# Patient Record
Sex: MALE | Race: OTHER | NOT HISPANIC OR LATINO | URBAN - METROPOLITAN AREA
[De-identification: names, ages, dates, MRNs, and addresses within clinical notes are randomized per-mention and may not be internally consistent; named-entity substitution may affect disease eponyms.]

---

## 2018-06-17 ENCOUNTER — EMERGENCY (EMERGENCY)
Facility: HOSPITAL | Age: 38
LOS: 0 days | Discharge: HOME | End: 2018-06-17
Admitting: PHYSICIAN ASSISTANT

## 2018-06-17 VITALS
HEART RATE: 89 BPM | OXYGEN SATURATION: 97 % | SYSTOLIC BLOOD PRESSURE: 137 MMHG | TEMPERATURE: 99 F | RESPIRATION RATE: 18 BRPM | DIASTOLIC BLOOD PRESSURE: 62 MMHG

## 2018-06-17 DIAGNOSIS — Z91.010 ALLERGY TO PEANUTS: ICD-10-CM
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
## 2018-06-17 DIAGNOSIS — M25.561 PAIN IN RIGHT KNEE: ICD-10-CM
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
## 2018-06-17 DIAGNOSIS — S89.91XA UNSPECIFIED INJURY OF RIGHT LOWER LEG, INITIAL ENCOUNTER: ICD-10-CM
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
## 2018-06-17 RX ORDER — IBUPROFEN 200 MG
600 TABLET ORAL ONCE
Qty: 0 | Refills: 0 | Status: COMPLETED | OUTPATIENT
Start: 2018-06-17 | End: 2018-06-17

## 2018-06-17 RX ADMIN — Medication 600 MILLIGRAM(S): at 21:25

## 2018-06-17 NOTE — ED PROVIDER NOTE - PROGRESS NOTE DETAILS
Counseled on red flags and to return for them. Counseled on importance of follow up. Patient repeats back instructions. Patient advised that they or their doctor may call [phone] to follow up on the specific results of the tests performed today in the emergency department.   Patient appears well on discharge.

## 2018-06-17 NOTE — ED PROVIDER NOTE - PHYSICAL EXAMINATION
PHYSICAL EXAM:    GENERAL: Alert, appears stated age, well appearing, non-toxic  SKIN: Warm, pink and dry. MMM.   EYE: Normal lids/conjunctiva  ENT: Normal hearing, patent oropharynx  NECK: +supple. no tenderness/step offs.   Pulm: Bilateral BS, normal resp effort, no wheezes, stridor, or retractions  CV: RRR, no M/R/G, 2+ pulses   Abd: soft, non-tender, non-distended  Mskel: no erythema, cyanosis. +R knee TTP over lateral joint line, no other TTP. +swelling to R knee. +decreased ROM of knee due to pain. no TTP joint above or below. no other TTP throughout. 2+ popliteal and DP pulses. no spinal TTP.   Neuro: AAOx3, no sensory deficits. 2/5 strength in R knee due to pain, otherwise 5/5 strength throughout. antalgic gait.

## 2018-06-17 NOTE — ED PROVIDER NOTE - NS ED ROS FT
Review of Systems    Constitutional: (-) fever  Cardiovascular: (-) chest pain, (-) syncope  Respiratory: (-) cough, (-) shortness of breath  Gastrointestinal: (-) vomiting, (-) diarrhea  Musculoskeletal: (-) neck pain, (-) back pain  Neurological: (-) headache

## 2018-06-17 NOTE — ED PROCEDURE NOTE - CPROC ED POST PROC CARE GUIDE1
Instructed patient/caregiver to follow-up with primary care physician./Verbal/written post procedure instructions were given to patient/caregiver./Keep the cast/splint/dressing clean and dry./Elevate the injured extremity as instructed./Instructed patient/caregiver regarding signs and symptoms of infection.

## 2018-06-17 NOTE — ED PROVIDER NOTE - OBJECTIVE STATEMENT
39 y/o M without PMH presents with R knee pain s/p jumping, landing on 2 feet, but then having a feeling that his "kneecap popped out and then back in" 1 hr ago. denies previous injury to this knee. +decreased ROM of R knee. + R knee swelling. denies other injuries, paraesthesias, change in color, warmth. Denies CP, palpitations, SOB, back pain, abdominal pain, n/v/d, fevers, direct trauma, fall, rash, open wounds.